# Patient Record
Sex: FEMALE | Race: AMERICAN INDIAN OR ALASKA NATIVE | URBAN - METROPOLITAN AREA
[De-identification: names, ages, dates, MRNs, and addresses within clinical notes are randomized per-mention and may not be internally consistent; named-entity substitution may affect disease eponyms.]

---

## 2022-12-19 ENCOUNTER — OFFICE VISIT (OUTPATIENT)
Dept: URBAN - METROPOLITAN AREA CLINIC 64 | Facility: CLINIC | Age: 67
End: 2022-12-19
Payer: COMMERCIAL

## 2022-12-19 DIAGNOSIS — H17.9 CORNEAL SCAR: ICD-10-CM

## 2022-12-19 DIAGNOSIS — H25.813 COMBINED FORMS OF AGE-RELATED CATARACT, BILATERAL: Primary | ICD-10-CM

## 2022-12-19 PROCEDURE — 99204 OFFICE O/P NEW MOD 45 MIN: CPT | Performed by: OPTOMETRIST

## 2022-12-19 ASSESSMENT — INTRAOCULAR PRESSURE
OD: 11
OS: 12

## 2022-12-19 ASSESSMENT — KERATOMETRY
OS: 43.77
OD: 43.27

## 2022-12-19 ASSESSMENT — VISUAL ACUITY
OD: 20/50
OS: 20/40

## 2022-12-19 NOTE — IMPRESSION/PLAN
Impression: Corneal scar: H17.9. Plan: Central OD, peripheral OS, denser OS. Discussed will likely still effect vision after cataract surgery.

## 2022-12-19 NOTE — IMPRESSION/PLAN
Impression: Combined forms of age-related cataract, bilateral: H25.813. Plan: Discussed and reviewed treatment options for cataracts. Risks and benefits of surgical procedure were explained and understood. Patient elects surgical treatment. Recommend surgery OD  first. Patient is candidate/interested in: monofocal,  toric lens/astigmatism correction, LenSx, ORA. Outcome of surgery limitations include:


Schedule for consult with Dr. Savanah García or Dr. Puja Levy.

## 2023-01-05 ENCOUNTER — OFFICE VISIT (OUTPATIENT)
Dept: URBAN - METROPOLITAN AREA CLINIC 64 | Facility: CLINIC | Age: 68
End: 2023-01-05
Payer: COMMERCIAL

## 2023-01-05 DIAGNOSIS — H25.813 COMBINED FORMS OF AGE-RELATED CATARACT, BILATERAL: Primary | ICD-10-CM

## 2023-01-05 DIAGNOSIS — H17.9 CORNEAL SCAR: ICD-10-CM

## 2023-01-05 DIAGNOSIS — H52.213 IRREGULAR ASTIGMATISM, BILATERAL: ICD-10-CM

## 2023-01-05 PROCEDURE — 99204 OFFICE O/P NEW MOD 45 MIN: CPT | Performed by: STUDENT IN AN ORGANIZED HEALTH CARE EDUCATION/TRAINING PROGRAM

## 2023-01-05 ASSESSMENT — INTRAOCULAR PRESSURE
OS: 9
OD: 11

## 2023-01-05 NOTE — IMPRESSION/PLAN
Impression: Corneal scar: H17.9. Plan: NO ORA or AM recommended. Discussed scaring is limiting factor, patient will likely need glasses after surgery for best vision.

## 2023-01-05 NOTE — IMPRESSION/PLAN
Impression: Irregular astigmatism, bilateral: H52.213. Plan: Induced by corneal scaring,. ORA and AM not recommended. Discussed patient will likely need glasses after surgery for best vision.

## 2023-01-05 NOTE — IMPRESSION/PLAN
Impression: Combined forms of age-related cataract, bilateral: H25.813. Plan: Discussed cataracts, treatment options, and surgical risks/benefits with patient including bleeding, infection, capsular break, glaucoma, corneal clouding. Patient understands there are tradeoffs to each intraocular lens choice and glasses may still be necessary after surgery. Pt understands that multifocal intraocular lenses have side effects including but not limited to halos/glare/difficulty in dim lighting and with intermediate vision. The patient is bothered by the symptoms of their cataract which is not correctable with a change in glasses and their ADL's are impaired. Patient elects surgical treatment and wishes to proceed. There is reasonable expectation that both the patient's visual and functional status will improve after surgery. Post op care to be patient's choice of provider/clinic and may include comanagement with a local or distant optometrist/ophthalmologist.

 Recommend ORA. Recommend Dextenza (pending insurance authorization) + Moxifloxacin (PF) Intracameral Injection Lens Recommendation: MONOFOCAL Technology: None Aim OD: -0.25. Aim OS: -0.25. First Eye: RIGHT Notes: 
High myopia, risks of RD discussed at length. Prominent Central corneal scaring OD and paracentral corneal scaring OS, discussed may affect visual/surgical outcome. Irregular astigmatism due to scaring OU. ORA/AM not recommended. Discussed patient will likely need glasses after surgery for best vision.

## 2023-04-05 ENCOUNTER — SURGERY (OUTPATIENT)
Dept: URBAN - METROPOLITAN AREA SURGERY 42 | Facility: SURGERY | Age: 68
End: 2023-04-05
Payer: COMMERCIAL

## 2023-04-05 ENCOUNTER — TESTING ONLY (OUTPATIENT)
Dept: URBAN - METROPOLITAN AREA CLINIC 64 | Facility: CLINIC | Age: 68
End: 2023-04-05
Payer: COMMERCIAL

## 2023-04-05 DIAGNOSIS — H25.813 COMBINED FORMS OF AGE-RELATED CATARACT, BILATERAL: Primary | ICD-10-CM

## 2023-04-05 PROCEDURE — 76519 ECHO EXAM OF EYE: CPT | Performed by: STUDENT IN AN ORGANIZED HEALTH CARE EDUCATION/TRAINING PROGRAM

## 2023-04-05 PROCEDURE — 66984 XCAPSL CTRC RMVL W/O ECP: CPT | Performed by: STUDENT IN AN ORGANIZED HEALTH CARE EDUCATION/TRAINING PROGRAM

## 2023-04-05 PROCEDURE — PR1CP PR1CP: CUSTOM | Performed by: STUDENT IN AN ORGANIZED HEALTH CARE EDUCATION/TRAINING PROGRAM

## 2023-04-06 ENCOUNTER — POST-OPERATIVE VISIT (OUTPATIENT)
Dept: URBAN - METROPOLITAN AREA CLINIC 64 | Facility: CLINIC | Age: 68
End: 2023-04-06
Payer: COMMERCIAL

## 2023-04-06 DIAGNOSIS — Z48.810 ENCOUNTER FOR SURGICAL AFTERCARE FOLLOWING SURGERY ON A SENSE ORGAN: Primary | ICD-10-CM

## 2023-04-06 PROCEDURE — 99024 POSTOP FOLLOW-UP VISIT: CPT | Performed by: OPTOMETRIST

## 2023-04-06 ASSESSMENT — INTRAOCULAR PRESSURE: OD: 10

## 2023-04-06 NOTE — IMPRESSION/PLAN
Impression: S/P Cataract Extraction by phacoemulsification with IOL placement; ORA OD - 1 Day. Encounter for surgical aftercare following surgery on a sense organ  Z48.640.  Plan:

## 2023-04-13 ENCOUNTER — POST-OPERATIVE VISIT (OUTPATIENT)
Dept: URBAN - METROPOLITAN AREA CLINIC 64 | Facility: CLINIC | Age: 68
End: 2023-04-13
Payer: COMMERCIAL

## 2023-04-13 DIAGNOSIS — Z48.810 ENCOUNTER FOR SURGICAL AFTERCARE FOLLOWING SURGERY ON A SENSE ORGAN: Primary | ICD-10-CM

## 2023-04-13 PROCEDURE — 99024 POSTOP FOLLOW-UP VISIT: CPT | Performed by: OPTOMETRIST

## 2023-04-13 ASSESSMENT — INTRAOCULAR PRESSURE
OD: 15
OS: 14

## 2023-04-13 ASSESSMENT — VISUAL ACUITY: OD: 20/30

## 2023-04-13 NOTE — IMPRESSION/PLAN
Impression: S/P Cataract Extraction by phacoemulsification with IOL placement; ORA OD - 8 Days. Encounter for surgical aftercare following surgery on a sense organ  Z48.170.  Plan:

## 2023-04-27 ENCOUNTER — POST-OPERATIVE VISIT (OUTPATIENT)
Dept: URBAN - METROPOLITAN AREA CLINIC 64 | Facility: CLINIC | Age: 68
End: 2023-04-27
Payer: COMMERCIAL

## 2023-04-27 DIAGNOSIS — Z48.810 ENCOUNTER FOR SURGICAL AFTERCARE FOLLOWING SURGERY ON A SENSE ORGAN: Primary | ICD-10-CM

## 2023-04-27 PROCEDURE — 99024 POSTOP FOLLOW-UP VISIT: CPT | Performed by: OPTOMETRIST

## 2023-04-27 ASSESSMENT — INTRAOCULAR PRESSURE
OS: 13
OD: 12

## 2023-04-27 ASSESSMENT — VISUAL ACUITY
OS: 20/40
OD: 20/40

## 2023-04-27 NOTE — IMPRESSION/PLAN
Impression: S/P Cataract Extraction by phacoemulsification with IOL placement; ORA OD - 22 Days. Encounter for surgical aftercare following surgery on a sense organ  Z48.810. Plan: Central corneal scar OU, likely contributing to poor refractive outcome. Updated glasses Rx given today. RTC 3 months for FU with Dr. Lupe Dowd.

## 2023-06-06 ENCOUNTER — OFFICE VISIT (OUTPATIENT)
Dept: URBAN - METROPOLITAN AREA CLINIC 64 | Facility: LOCATION | Age: 68
End: 2023-06-06
Payer: COMMERCIAL

## 2023-06-06 DIAGNOSIS — H25.812 COMBINED FORMS OF AGE-RELATED CATARACT, LEFT EYE: Primary | ICD-10-CM

## 2023-06-06 DIAGNOSIS — H52.4 PRESBYOPIA: ICD-10-CM

## 2023-06-06 DIAGNOSIS — Z96.1 PRESENCE OF PSEUDOPHAKIA: ICD-10-CM

## 2023-06-06 DIAGNOSIS — H17.9 CORNEAL SCAR: ICD-10-CM

## 2023-06-06 PROCEDURE — 99214 OFFICE O/P EST MOD 30 MIN: CPT

## 2023-06-06 ASSESSMENT — INTRAOCULAR PRESSURE
OD: 15
OS: 13

## 2023-06-06 ASSESSMENT — VISUAL ACUITY
OD: 20/40
OS: 20/25

## 2023-06-06 ASSESSMENT — KERATOMETRY
OD: 42.65
OS: 43.05

## 2023-06-06 NOTE — IMPRESSION/PLAN
Impression: Presence of pseudophakia: Z96.1. Plan: Pt educated. S/p CE/IOL OD, lens well positioned and clear. Monitor.

## 2023-06-06 NOTE — IMPRESSION/PLAN
Impression: Combined forms of age-related cataract, left eye: H25.812. Plan: Patient educated on condition. No significant decrease in vision and difficulty with activities of daily living. No surgery indicated at this time.  Monitor in 6 months with glare test.

## 2023-12-06 ENCOUNTER — OFFICE VISIT (OUTPATIENT)
Dept: URBAN - METROPOLITAN AREA CLINIC 64 | Facility: LOCATION | Age: 68
End: 2023-12-06
Payer: COMMERCIAL

## 2023-12-06 DIAGNOSIS — H25.812 COMBINED FORMS OF AGE-RELATED CATARACT, LEFT EYE: Primary | ICD-10-CM

## 2023-12-06 PROCEDURE — 99213 OFFICE O/P EST LOW 20 MIN: CPT | Performed by: OPTOMETRIST

## 2024-06-25 ENCOUNTER — OFFICE VISIT (OUTPATIENT)
Dept: URBAN - METROPOLITAN AREA CLINIC 64 | Facility: LOCATION | Age: 69
End: 2024-06-25
Payer: COMMERCIAL

## 2024-06-25 DIAGNOSIS — H17.9 CORNEAL SCAR: ICD-10-CM

## 2024-06-25 DIAGNOSIS — H52.4 PRESBYOPIA: ICD-10-CM

## 2024-06-25 DIAGNOSIS — Z96.1 PRESENCE OF PSEUDOPHAKIA: ICD-10-CM

## 2024-06-25 DIAGNOSIS — H25.812 COMBINED FORMS OF AGE-RELATED CATARACT, LEFT EYE: Primary | ICD-10-CM

## 2024-06-25 PROCEDURE — 99214 OFFICE O/P EST MOD 30 MIN: CPT | Performed by: OPTOMETRIST

## 2024-06-25 ASSESSMENT — VISUAL ACUITY
OS: 20/30
OD: 20/30

## 2024-06-25 ASSESSMENT — INTRAOCULAR PRESSURE
OD: 15
OS: 15

## 2024-06-25 ASSESSMENT — KERATOMETRY
OS: 43.73
OD: 43.30

## 2025-06-25 ENCOUNTER — OFFICE VISIT (OUTPATIENT)
Dept: URBAN - METROPOLITAN AREA CLINIC 64 | Facility: LOCATION | Age: 70
End: 2025-06-25
Payer: COMMERCIAL

## 2025-06-25 DIAGNOSIS — H25.12 AGE-RELATED NUCLEAR CATARACT, LEFT EYE: ICD-10-CM

## 2025-06-25 DIAGNOSIS — H52.4 PRESBYOPIA: ICD-10-CM

## 2025-06-25 DIAGNOSIS — H43.393 OTHER VITREOUS OPACITIES, BILATERAL: Primary | ICD-10-CM

## 2025-06-25 DIAGNOSIS — H26.491 OTHER SECONDARY CATARACT, RIGHT EYE: ICD-10-CM

## 2025-06-25 PROCEDURE — 99214 OFFICE O/P EST MOD 30 MIN: CPT | Performed by: OPTOMETRIST

## 2025-06-25 ASSESSMENT — INTRAOCULAR PRESSURE
OD: 15
OS: 12